# Patient Record
Sex: MALE | Race: WHITE | NOT HISPANIC OR LATINO | Employment: STUDENT | URBAN - METROPOLITAN AREA
[De-identification: names, ages, dates, MRNs, and addresses within clinical notes are randomized per-mention and may not be internally consistent; named-entity substitution may affect disease eponyms.]

---
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
## 2020-02-03 ENCOUNTER — OFFICE VISIT (OUTPATIENT)
Dept: FAMILY MEDICINE | Facility: CLINIC | Age: 20
End: 2020-02-03
Payer: COMMERCIAL

## 2020-02-03 VITALS
DIASTOLIC BLOOD PRESSURE: 80 MMHG | BODY MASS INDEX: 22.6 KG/M2 | WEIGHT: 157.88 LBS | OXYGEN SATURATION: 98 % | RESPIRATION RATE: 16 BRPM | TEMPERATURE: 99 F | HEART RATE: 67 BPM | HEIGHT: 70 IN | SYSTOLIC BLOOD PRESSURE: 124 MMHG

## 2020-02-03 DIAGNOSIS — B34.9 VIRAL SYNDROME: Primary | ICD-10-CM

## 2020-02-03 PROCEDURE — 99999 PR PBB SHADOW E&M-EST. PATIENT-LVL III: ICD-10-PCS | Mod: PBBFAC,,, | Performed by: PHYSICIAN ASSISTANT

## 2020-02-03 PROCEDURE — 3008F PR BODY MASS INDEX (BMI) DOCUMENTED: ICD-10-PCS | Mod: CPTII,S$GLB,, | Performed by: PHYSICIAN ASSISTANT

## 2020-02-03 PROCEDURE — 99213 OFFICE O/P EST LOW 20 MIN: CPT | Mod: S$GLB,,, | Performed by: PHYSICIAN ASSISTANT

## 2020-02-03 PROCEDURE — 99213 PR OFFICE/OUTPT VISIT, EST, LEVL III, 20-29 MIN: ICD-10-PCS | Mod: S$GLB,,, | Performed by: PHYSICIAN ASSISTANT

## 2020-02-03 PROCEDURE — 99999 PR PBB SHADOW E&M-EST. PATIENT-LVL III: CPT | Mod: PBBFAC,,, | Performed by: PHYSICIAN ASSISTANT

## 2020-02-03 PROCEDURE — 3008F BODY MASS INDEX DOCD: CPT | Mod: CPTII,S$GLB,, | Performed by: PHYSICIAN ASSISTANT

## 2020-02-03 RX ORDER — LEVOCETIRIZINE DIHYDROCHLORIDE 5 MG/1
5 TABLET, FILM COATED ORAL NIGHTLY
Qty: 30 TABLET | Refills: 11 | Status: SHIPPED | OUTPATIENT
Start: 2020-02-03 | End: 2021-02-02

## 2020-02-03 NOTE — LETTER
February 3, 2020                 [facility]  Family Medicine  [address] [facility] [facility] [address] [place] [address]  Phone: [phone]  Fax: [phone]   February 3, 2020     Patient: Jayy Mayer   YOB: 2000   Date of Visit: 2/3/2020       To Whom it May Concern:    Jayy Mayer was seen in my clinic on 2/3/2020. He may return to work on 2/5/2020.    Please excuse him from any classes or work missed.    If you have any questions or concerns, please don't hesitate to call.    Sincerely,         MAIRA Patino

## 2020-02-04 NOTE — PROGRESS NOTES
Subjective:       Patient ID: Jayy Mayer is a 19 y.o. male with multiple medical diagnoses as listed in the medical history and problem list that presents for URI (3 DAYS)  .    Chief Complaint: URI (3 DAYS)      URI    This is a new problem. The current episode started in the past 7 days. The maximum temperature recorded prior to his arrival was 102 - 102.9 F (resolved ). Associated symptoms include congestion, coughing and headaches. Pertinent negatives include no diarrhea, dysuria, ear pain, nausea, plugged ear sensation, rhinorrhea, sinus pain, sneezing, sore throat or wheezing. He has tried NSAIDs (nothing in last 2 days ) for the symptoms.        Review of Systems   Constitutional: Positive for fatigue and fever. Negative for chills.   HENT: Positive for congestion, postnasal drip and sinus pressure. Negative for ear pain, rhinorrhea, sinus pain, sneezing and sore throat.    Eyes: Negative for pain, discharge, redness and itching.   Respiratory: Positive for cough. Negative for chest tightness, shortness of breath and wheezing.    Gastrointestinal: Negative.  Negative for diarrhea and nausea.   Genitourinary: Negative for dysuria.   Musculoskeletal: Positive for myalgias.   Neurological: Positive for headaches.         PAST MEDICAL HISTORY:  Past Medical History:   Diagnosis Date    Allergy     Asthma        SOCIAL HISTORY:  Social History     Socioeconomic History    Marital status: Single     Spouse name: Not on file    Number of children: Not on file    Years of education: Not on file    Highest education level: Not on file   Occupational History    Not on file   Social Needs    Financial resource strain: Not on file    Food insecurity:     Worry: Not on file     Inability: Not on file    Transportation needs:     Medical: Not on file     Non-medical: Not on file   Tobacco Use    Smoking status: Never Smoker    Smokeless tobacco: Never Used   Substance and Sexual Activity    Alcohol use:  "No     Alcohol/week: 0.0 standard drinks    Drug use: No    Sexual activity: Never     Birth control/protection: None   Lifestyle    Physical activity:     Days per week: Not on file     Minutes per session: Not on file    Stress: Not on file   Relationships    Social connections:     Talks on phone: Not on file     Gets together: Not on file     Attends Congregational service: Not on file     Active member of club or organization: Not on file     Attends meetings of clubs or organizations: Not on file     Relationship status: Not on file   Other Topics Concern    Not on file   Social History Narrative    Not on file       ALLERGIES AND MEDICATIONS: updated and reviewed.  Review of patient's allergies indicates:   Allergen Reactions    Legumes     Peanut      Current Outpatient Medications   Medication Sig Dispense Refill    epinephrine (EPIPEN) 0.3 mg/0.3 mL AtIn Inject 1 each into the muscle as needed.      fluticasone (FLOVENT HFA) 44 mcg/actuation inhaler Inhale 1 puff into the lungs 2 (two) times daily. 16 g 0    clindamycin-benzoyl peroxide 1-5 % GlwP JOSE FRANCISCO AA ON FACE QHS  3    hydrOXYzine HCl (ATARAX) 25 MG tablet Take 1 tablet (25 mg total) by mouth 3 (three) times daily. 60 tablet 2    levocetirizine (XYZAL) 5 MG tablet Take 1 tablet (5 mg total) by mouth every evening. 30 tablet 11    sulfamethoxazole-trimethoprim 800-160mg (BACTRIM DS) 800-160 mg Tab Take 1 tablet by mouth 2 (two) times daily. 14 tablet 0     No current facility-administered medications for this visit.          Objective:   /80   Pulse 67   Temp 99.2 °F (37.3 °C) (Oral)   Resp 16   Ht 5' 10" (1.778 m)   Wt 71.6 kg (157 lb 13.6 oz)   SpO2 98%   BMI 22.65 kg/m²      Physical Exam   Constitutional: He is oriented to person, place, and time. No distress.   HENT:   Head: Normocephalic and atraumatic.   Right Ear: External ear and ear canal normal. No middle ear effusion.   Left Ear: External ear and ear canal normal.  No " middle ear effusion.   Nose: Mucosal edema and rhinorrhea present. Right sinus exhibits no maxillary sinus tenderness and no frontal sinus tenderness. Left sinus exhibits no maxillary sinus tenderness and no frontal sinus tenderness.   Mouth/Throat: Uvula is midline and mucous membranes are normal. Posterior oropharyngeal erythema (PND) present. No oropharyngeal exudate or posterior oropharyngeal edema.   Eyes: Conjunctivae and EOM are normal.   Cardiovascular: Normal rate and regular rhythm.   Pulmonary/Chest: Effort normal and breath sounds normal. He has no wheezes.   Lymphadenopathy:     He has no cervical adenopathy.   Neurological: He is alert and oriented to person, place, and time.           Assessment:       1. Viral syndrome        Plan:       Viral syndrome  -     levocetirizine (XYZAL) 5 MG tablet; Take 1 tablet (5 mg total) by mouth every evening.  Dispense: 30 tablet; Refill: 11    Call for fever 100.4 or higher returns, change in discharge color, no improvement in 7-10 days.   Return to work in 2 days if afebrile persist.           No follow-ups on file.